# Patient Record
Sex: FEMALE | Race: ASIAN | NOT HISPANIC OR LATINO | Employment: FULL TIME | URBAN - METROPOLITAN AREA
[De-identification: names, ages, dates, MRNs, and addresses within clinical notes are randomized per-mention and may not be internally consistent; named-entity substitution may affect disease eponyms.]

---

## 2022-03-29 LAB — HCV AB SER-ACNC: NEGATIVE

## 2023-04-04 ENCOUNTER — OFFICE VISIT (OUTPATIENT)
Dept: DERMATOLOGY | Facility: CLINIC | Age: 48
End: 2023-04-04

## 2023-04-04 VITALS — WEIGHT: 147 LBS | TEMPERATURE: 97.3 F | HEIGHT: 64 IN | BODY MASS INDEX: 25.1 KG/M2

## 2023-04-04 DIAGNOSIS — L64.9 ANDROGENIC ALOPECIA: Primary | ICD-10-CM

## 2023-04-04 RX ORDER — METRONIDAZOLE 7.5 MG/G
GEL VAGINAL
COMMUNITY
Start: 2023-04-03

## 2023-04-04 RX ORDER — DIPHENOXYLATE HYDROCHLORIDE AND ATROPINE SULFATE 2.5; .025 MG/1; MG/1
1 TABLET ORAL DAILY
COMMUNITY

## 2023-04-04 RX ORDER — OMEGA-3S/DHA/EPA/FISH OIL/D3 300MG-1000
400 CAPSULE ORAL DAILY
COMMUNITY

## 2023-04-04 NOTE — PROGRESS NOTES
"[place] [address] [facility]     Patient Name: Long Brice  Encounter Date: 04/04/2023    Have you been cared for by a [place] [address] Dermatologist in the last 3 years and, if so, which description applies to you? NO  I am considered a \"new\" patient and must complete all patient intake questions  I am FEMALE/of child-bearing potential     REVIEW OF SYSTEMS:  Have you recently had or currently have any of the following? · Recent fever or chills? No  · Any non-healing wound? No  · Are you pregnant or planning to become pregnant? No  · Are you currently or planning to be nursing or breast feeding? No   PAST MEDICAL HISTORY:  Have you personally ever had or currently have any of the following? If \"YES,\" then please provide more detail  · Skin cancer (such as Melanoma, Basal Cell Carcinoma, Squamous Cell Carcinoma? No  · Tuberculosis, HIV/AIDS, Hepatitis B or C: No  · Systemic Immunosuppression such as Diabetes, Biologic or Immunotherapy, Chemotherapy, Organ Transplantation, Bone Marrow Transplantation No  · Radiation Treatment No   FAMILY HISTORY:  Any \"first degree relatives\" (parent, brother, sister, or child) with the following? • Skin Cancer, Pancreatic or Other Cancer? No   PATIENT EXPERIENCE:    • Do you want the Dermatologist to perform a COMPLETE skin exam today including a clinical examination under the \"bra and underwear\" areas? NO  • If necessary, do we have your permission to call and leave a detailed message on your Preferred Phone number that includes your specific medical information?   Yes      Allergies   Allergen Reactions   • Hydrocodone-Acetaminophen GI Intolerance     nausea      Current Outpatient Medications:   •  cholecalciferol (VITAMIN D3) 400 units tablet, Take 400 Units by mouth daily, Disp: , Rfl:   •  metroNIDAZOLE (METROGEL) 0 75 % vaginal gel, , Disp: , Rfl:   •  multivitamin (THERAGRAN) TABS, Take 1 tablet by mouth daily, Disp: , Rfl:           • Whom besides the patient " is providing clinical information about today's encounter?   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    Physical Exam:  • Anatomic Location Affected:  scalp  • Morphological Description:  thinning, increased variability  • Pertinent Positives:  • Pertinent Negatives: Additional History of Present Condition:  Patient here for hair loss has been happening for a couple of years, patient has been using minoxidil, vivascal, and darryl oil  Assessment and Plan:  Based on a thorough discussion of this condition and the management approach to it (including a comprehensive discussion of the known risks, side effects and potential benefits of treatment), the patient (family) agrees to implement the following specific plan:  • Discussed treatment options, such as:  o over the counter rogaine (minoxidil) 5% foam  Use one cap full a day on dry hair, part hair and apply directly to scalp  Do not do too close to bedtime  Need to do this daily  If you stop abruptly, you will lose hair  Can taper off if you don't like it    o Could do laser therapy like laser caps or thakur (examples: capillus, laser hair max)  Buy over the counter, online  o Cosmetic treatments like platelet rich plasma (PRP) do it monthly for 3 months   Dr Blanca Lee in our practice does this   o Hair transplant surgery   o Hair fibers, put on topically so it looks more full (example topix)  o Oral pills: spironolactone (hormonal blood pressure medication, risks include decreased blood pressure, lightheadedness, abnormal menses, breast tenderness, need to be careful of potassium intake), finasteride/dutasteride (hormonal medication, not recommended if personal or family history of breast/ovarian cancers) and minoxidil oral pills (risk of cardiac side effects such as swelling and fluid around heart)  • Plan for topical Minoxidil Scribe Attestation    I,:  Anabell Abebe MA am acting as a scribe while in the presence of the attending physician :       I,:  Kamala Sanabria MD personally performed the services described in this documentation    as scribed in my presence :

## 2023-04-04 NOTE — PATIENT INSTRUCTIONS
Assessment and Plan:  Based on a thorough discussion of this condition and the management approach to it (including a comprehensive discussion of the known risks, side effects and potential benefits of treatment), the patient (family) agrees to implement the following specific plan:  Discussed treatment options, such as:  over the counter rogaine (minoxidil) 5% foam  Use one cap full a day on dry hair, part hair and apply directly to scalp  Do not do too close to bedtime  Need to do this daily  If you stop abruptly, you will lose hair  Can taper off if you don't like it  Could do laser therapy like laser caps or thakur (examples: capillus, laser hair max)  Buy over the counter, online  Cosmetic treatments like platelet rich plasma (PRP) do it monthly for 3 months  Dr Venkata Barreto in our practice does this    Hair transplant surgery   Hair fibers, put on topically so it looks more full (example topix)  Oral pills: spironolactone (hormonal blood pressure medication, risks include decreased blood pressure, lightheadedness, abnormal menses, breast tenderness, need to be careful of potassium intake), finasteride/dutasteride (hormonal medication, not recommended if personal or family history of breast/ovarian cancers) and minoxidil oral pills (risk of cardiac side effects such as swelling and fluid around heart)  Plan for topical Minoxidil

## 2023-05-03 ENCOUNTER — TELEPHONE (OUTPATIENT)
Dept: ADMINISTRATIVE | Facility: OTHER | Age: 48
End: 2023-05-03

## 2023-05-03 PROBLEM — B27.00 GAMMAHERPESVIRAL MONONUCLEOSIS WITHOUT COMPLICATION: Status: ACTIVE | Noted: 2020-12-31

## 2023-05-03 PROBLEM — B27.00 GAMMAHERPESVIRAL MONONUCLEOSIS WITHOUT COMPLICATION: Status: RESOLVED | Noted: 2020-12-31 | Resolved: 2023-05-03

## 2023-05-03 PROBLEM — E55.9 VITAMIN D DEFICIENCY: Status: ACTIVE | Noted: 2020-12-31

## 2023-05-03 PROBLEM — E78.2 MIXED HYPERLIPIDEMIA: Status: ACTIVE | Noted: 2023-01-09

## 2023-05-03 NOTE — PROGRESS NOTES
Avis Allen 1975 female MRN: [id number]      ASSESSMENT/PLAN  Problem List Items Addressed This Visit    None  Visit Diagnoses     Healthcare maintenance    -  Primary        BP WNL   Labs UTD (had through work) -- is going to send records   Pap UTD   Mammogram UTD   CRC UTD -- will request records   Vaccinations: TDap UTD, COVID completed primary series  Encouraged regular physical activity, varied diet, and regular dental/eye exams     No future appointments  SUBJECTIVE  CC: Establish Care      HPI:  Avis Allen is a 49 y o  female who presents to establish care  History reviewed and updated as below  No acute concerns  Review of Systems   Constitutional: Negative for unexpected weight change  HENT: Negative for congestion, ear pain, rhinorrhea and sore throat  Eyes: Negative for visual disturbance  Respiratory: Negative for cough and shortness of breath  Cardiovascular: Negative for chest pain, palpitations and leg swelling  Gastrointestinal: Negative for abdominal pain, constipation and diarrhea  Endocrine: Negative for polyuria  Genitourinary: Negative for dysuria and menstrual problem  Musculoskeletal: Positive for back pain (seeing chiropractor/PT)  Neurological: Negative for dizziness and headaches  Psychiatric/Behavioral: Negative for sleep disturbance         Historical Information   The patient history was reviewed and updated as follows:    Past Medical History:   Diagnosis Date     delivery delivered     Gammaherpesviral mononucleosis without complication      Past Surgical History:   Procedure Laterality Date    BUNIONECTOMY Bilateral      SECTION      x2    TUBAL LIGATION Bilateral     removal     Family History   Problem Relation Age of Onset    Breast cancer Mother         46s    No Known Problems Father     No Known Problems Brother     Osteoporosis Maternal Grandmother     Dementia Paternal Grandmother       Social "History   Social History     Substance and Sexual Activity   Alcohol Use Yes    Comment: Rare     Social History     Substance and Sexual Activity   Drug Use Not Currently     Social History     Tobacco Use   Smoking Status Never   Smokeless Tobacco Never       Medications:     Current Outpatient Medications:     minoxidil (ROGAINE) 2 % external solution, Apply topically 2 (two) times a day, Disp: , Rfl:     cholecalciferol (VITAMIN D3) 400 units tablet, Take 400 Units by mouth daily, Disp: , Rfl:     multivitamin (THERAGRAN) TABS, Take 1 tablet by mouth daily, Disp: , Rfl:   Allergies   Allergen Reactions    Hydrocodone-Acetaminophen GI Intolerance     nausea       OBJECTIVE    Vitals:   Vitals:    05/04/23 0802   BP: 112/76   BP Location: Left arm   Patient Position: Sitting   Cuff Size: Adult   Pulse: 74   Temp: 97 5 °F (36 4 °C)   SpO2: 96%   Weight: 63 5 kg (140 lb)   Height: 5' 4\" (1 626 m)           Physical Exam  Vitals and nursing note reviewed  Constitutional:       General: She is not in acute distress  Appearance: Normal appearance  HENT:      Head: Normocephalic and atraumatic  Right Ear: Tympanic membrane, ear canal and external ear normal       Left Ear: Tympanic membrane, ear canal and external ear normal       Nose: Nose normal       Mouth/Throat:      Mouth: Mucous membranes are moist       Pharynx: No oropharyngeal exudate or posterior oropharyngeal erythema  Eyes:      Conjunctiva/sclera: Conjunctivae normal    Cardiovascular:      Rate and Rhythm: Normal rate and regular rhythm  Pulmonary:      Effort: Pulmonary effort is normal  No respiratory distress  Breath sounds: Normal breath sounds  Abdominal:      General: Bowel sounds are normal  There is no distension  Palpations: Abdomen is soft  Tenderness: There is no abdominal tenderness  Musculoskeletal:      Right lower leg: No edema  Left lower leg: No edema     Lymphadenopathy:      Cervical: No " cervical adenopathy  Skin:     General: Skin is warm and dry  Neurological:      General: No focal deficit present  Mental Status: She is alert     Psychiatric:         Mood and Affect: Mood normal                     DO Patti [address]an [address] [facility]   5/4/2023  8:21 AM

## 2023-05-03 NOTE — TELEPHONE ENCOUNTER
----- Message from Amanda Whitley DO sent at 5/3/2023  9:48 AM EDT -----  05/03/23 9:49 AM    Hello, our patient Teto Artist has had Hepatitis C completed/performed  Please assist in updating the patient chart by pulling the Care Everywhere (CE) document  The date of service is 03/29/2022       Thank you,  Amanda Whitley DO  [facility]

## 2023-05-03 NOTE — TELEPHONE ENCOUNTER
Upon review of the In Basket request we were able to locate, review, and update the patient chart as requested for Hepatitis C  and Pap Smear (HPV) aka Cervical Cancer Screening  and Mammogram    Any additional questions or concerns should be emailed to the Practice Liaisons via the appropriate education email address, please do not reply via In Basket      Thank you  Tati Card Continue Regimen: Plaquenil and olux Detail Level: Zone Otc Regimen: Rogaine for men once to twice daily Plan: Hormonal alopecia discussed with patient

## 2023-05-03 NOTE — TELEPHONE ENCOUNTER
----- Message from [facility],  sent at 5/3/2023  9:47 AM EDT -----  05/03/23 9:47 AM    Hello, our patient Frances Mckeon has had Mammogram and Pap Smear (HPV) aka Cervical Cancer Screening completed/performed  Please assist in updating the patient chart by pulling the Care Everywhere (CE) document  The date of service is 03/29/2023 (pap), 10/21/2022 (mammo)       Thank you,  [facility], DO GALI NAJERA

## 2023-05-04 ENCOUNTER — TELEPHONE (OUTPATIENT)
Dept: ADMINISTRATIVE | Facility: OTHER | Age: 48
End: 2023-05-04

## 2023-05-04 ENCOUNTER — OFFICE VISIT (OUTPATIENT)
Dept: FAMILY MEDICINE CLINIC | Facility: CLINIC | Age: 48
End: 2023-05-04

## 2023-05-04 VITALS
OXYGEN SATURATION: 96 % | DIASTOLIC BLOOD PRESSURE: 76 MMHG | WEIGHT: 140 LBS | BODY MASS INDEX: 23.9 KG/M2 | HEART RATE: 74 BPM | TEMPERATURE: 97.5 F | SYSTOLIC BLOOD PRESSURE: 112 MMHG | HEIGHT: 64 IN

## 2023-05-04 DIAGNOSIS — Z00.00 HEALTHCARE MAINTENANCE: Primary | ICD-10-CM

## 2023-05-04 NOTE — LETTER
Procedure Request Form: Colonoscopy       Date Requested: 23  Patient: Jerome Nevareze  Patient : 1975   Referring Provider: Izella Client, DO        Date of Procedure _____4-4-21_____________________       The above patient has informed us that they have completed their   most recent Colonoscopy at your facility  Please complete   this form and attach all corresponding procedure reports/results  Comments __________________________________________________________  ____________________________________________________________________  ____________________________________________________________________  ____________________________________________________________________    Facility Completing Procedure _________________________________________    Form Completed By (print name) _______________________________________      Signature __________________________________________________________      These reports are needed for  compliance  Please fax this completed form and a copy of the procedure report to our office located at [place] [address] as soon as possible to Fax [phone] attention Sandie Mcfarlane: Phone [phone]    We thank you for your assistance in treating our mutual patient

## 2023-05-04 NOTE — TELEPHONE ENCOUNTER
----- Message from [address] sent at 5/4/2023  9:54 AM EDT -----  Regarding: Colonoscopy  05/04/23 9:55 AM    Hello, our patient aKte De La Cruz has had CRC: Colonoscopy completed/performed  Please assist in updating the patient chart by making an External outreach to Dr Guilherme Collins facility located in [facility]  The date of service is 07/06/2022      Thank you,  Ana NAJERA

## 2023-05-04 NOTE — TELEPHONE ENCOUNTER
----- Message from Juanjose Porras sent at 5/4/2023  9:53 AM EDT -----  Regarding: Deni Woodward Request  05/04/23 9:53 AM    Hello, our patient Piyush Choi has had CRC: Colonoscopy completed/performed  Please assist in updating the patient chart by making an External outreach to [address] [place] [address] located in [facility]  The date of service is 07/06/2022      Thank you,  Juanjose NAJERA

## 2023-05-08 NOTE — TELEPHONE ENCOUNTER
Upon review of the In Basket request and the patient's chart, initial outreach has been made via fax to facility  Please see Contacts section for details       Thank you  Namita Cooper

## 2023-05-09 NOTE — TELEPHONE ENCOUNTER
Upon review of the In Basket request we were able to locate, review, and update the patient chart as requested for CRC: Colonoscopy  Any additional questions or concerns should be emailed to the Practice Liaisons via the appropriate education email address, please do not reply via In Basket      Thank you  Sofi Thomas

## 2024-04-11 ENCOUNTER — OFFICE VISIT (OUTPATIENT)
Dept: OBGYN CLINIC | Facility: CLINIC | Age: 49
End: 2024-04-11
Payer: COMMERCIAL

## 2024-04-11 VITALS
WEIGHT: 129.4 LBS | DIASTOLIC BLOOD PRESSURE: 74 MMHG | BODY MASS INDEX: 22.09 KG/M2 | SYSTOLIC BLOOD PRESSURE: 102 MMHG | HEIGHT: 64 IN

## 2024-04-11 DIAGNOSIS — Z12.4 SCREENING FOR MALIGNANT NEOPLASM OF THE CERVIX: ICD-10-CM

## 2024-04-11 DIAGNOSIS — Z12.31 SCREENING MAMMOGRAM, ENCOUNTER FOR: ICD-10-CM

## 2024-04-11 DIAGNOSIS — Z01.419 WOMEN'S ANNUAL ROUTINE GYNECOLOGICAL EXAMINATION: Primary | ICD-10-CM

## 2024-04-11 DIAGNOSIS — R92.30 DENSE BREAST TISSUE ON MAMMOGRAM, UNSPECIFIED TYPE: ICD-10-CM

## 2024-04-11 PROCEDURE — S0612 ANNUAL GYNECOLOGICAL EXAMINA: HCPCS | Performed by: OBSTETRICS & GYNECOLOGY

## 2024-04-11 PROCEDURE — G0476 HPV COMBO ASSAY CA SCREEN: HCPCS | Performed by: OBSTETRICS & GYNECOLOGY

## 2024-04-11 PROCEDURE — G0145 SCR C/V CYTO,THINLAYER,RESCR: HCPCS | Performed by: OBSTETRICS & GYNECOLOGY

## 2024-04-11 NOTE — PROGRESS NOTES
Subjective      Titi Zuluaga is a 48 y.o. female who presents for annual well woman exam. Periods are regular every 28-30 days, lasting 6 days. No intermenstrual bleeding, spotting, or discharge.  Last month period lasted fro 13 days was spotting then normal flow for 7 days then pass some clots     Current contraception: tubal ligation  History of abnormal Pap smear: no  Family history of uterine or ovarian cancer: no  Family history of breast cancer: yes - mother < 50   Saw genetic  counselor and told she dosnt need BRCA   Menstrual History:  OB History          3    Para   2    Term   2            AB   1    Living   2         SAB   1    IAB        Ectopic        Multiple        Live Births   2           Obstetric Comments   14, 6 girls                 Patient's last menstrual period was 2024.  Period Duration (Days): 5-7days  Period Pattern: (!) Irregular (period lasting for 2 weeks for the last 1.5-2 yrs)  Menstrual Flow: Moderate  Dysmenorrhea: (!) Mild  Dysmenorrhea Symptoms: Cramping    The following portions of the patient's history were reviewed and updated as appropriate: allergies, current medications, past family history, past medical history, past social history, past surgical history, and problem list.    Review of Systems  Review of Systems   Constitutional:  Negative for activity change, appetite change, chills, fatigue and fever.   Respiratory:  Negative for apnea, cough, chest tightness and shortness of breath.    Cardiovascular:  Negative for chest pain, palpitations and leg swelling.   Gastrointestinal:  Negative for abdominal pain, constipation, diarrhea, nausea and vomiting.   Genitourinary:  Negative for difficulty urinating, dysuria, flank pain, frequency, hematuria and urgency.   Neurological:  Negative for dizziness, seizures, syncope, light-headedness, numbness and headaches.   Psychiatric/Behavioral:  Negative for agitation and confusion.           Objective      BP  "102/74 (BP Location: Left arm, Patient Position: Sitting, Cuff Size: Adult)   Ht 5' 4\" (1.626 m)   Wt 58.7 kg (129 lb 6.4 oz)   LMP 2024   BMI 22.21 kg/m²     Physical Exam  OBGyn Exam     General:   alert and oriented, in no acute distress, alert, appears stated age, and cooperative   Heart: regular rate and rhythm, S1, S2 normal, no murmur, click, rub or gallop   Lungs: clear to auscultation bilaterally   Abdomen: soft, non-tender, without masses or organomegaly   Vulva: normal   Vagina: normal mucosa, normal discharge   Cervix: no cervical motion tenderness and no lesions   Uterus: normal size   Adnexa:  Breast Exam:  normal adnexa  breasts appear normal, no suspicious masses, no skin or nipple changes or axillary nodes.                Assessment      @well woman@ .  48-year-old female  Annual exam  Prior 2  section  History of tubal ligation  Family history of breast cancer/dense breast  Patient had genetic consult done candidate for genetic testing  Mild MEGHANA  Plan   Pap/HPV  Diet/exercise  Calcium/vitamin D  Mammogram/ABUS ultrasound this year  Mammogram/MRI next year  Lifestyle medication reviewed and discussed with patient  Perimenopausal changes reviewed and discussed with patient  Magnesium at bedtime and decrease caffeine recommended to help sleep  Kegel exercise and bladder diet  Return to office for annual   All questions answered.     There are no Patient Instructions on file for this visit.     "

## 2024-04-12 LAB
HPV HR 12 DNA CVX QL NAA+PROBE: NEGATIVE
HPV16 DNA CVX QL NAA+PROBE: NEGATIVE
HPV18 DNA CVX QL NAA+PROBE: NEGATIVE

## 2024-04-18 LAB
LAB AP GYN PRIMARY INTERPRETATION: NORMAL
Lab: NORMAL

## 2024-05-29 PROBLEM — Z91.89 AT INCREASED RISK OF BREAST CANCER: Status: RESOLVED | Noted: 2024-01-23 | Resolved: 2024-05-29

## 2024-05-29 PROBLEM — R92.333 HETEROGENEOUSLY DENSE TISSUE OF BOTH BREASTS ON MAMMOGRAPHY: Status: ACTIVE | Noted: 2024-01-23

## 2024-05-29 NOTE — PROGRESS NOTES
Titi Zuluaga 1975 female MRN: [id number]      ASSESSMENT/PLAN  Problem List Items Addressed This Visit    None  Visit Diagnoses     Healthcare maintenance    -  Primary        BP WNL   Pt has labs through work -- will send copy   Pap UTD  Mammogram UTD  CRC UTD   Vaccinations: TDap UTD, Shingles reviewed, COVID completed primary   Encouraged regular physical activity, varied diet, and regular dental/eye exams         Future Appointments   Date Time Provider Department Center   2025  8:15 AM Leslie Corrales MD OBGYN [facility]          SUBJECTIVE  CC: Physical Exam      HPI:  Titi Zuluaga is a 49 y.o. female who presents for annual physical. History reviewed and updated as below.     Review of Systems   Constitutional:  Negative for unexpected weight change.   HENT:  Negative for congestion, ear pain, rhinorrhea and sore throat.    Eyes:  Negative for visual disturbance.   Respiratory:  Negative for cough and shortness of breath.    Cardiovascular:  Negative for chest pain, palpitations and leg swelling.   Gastrointestinal:  Negative for abdominal pain, blood in stool, constipation and diarrhea.   Endocrine: Negative for polyuria.   Genitourinary:  Negative for dysuria, hematuria and menstrual problem (had one episode of 2 weeks of bleeding, otherwise still normal).   Neurological:  Negative for dizziness and headaches.   Psychiatric/Behavioral:  Negative for sleep disturbance.        Historical Information   The patient history was reviewed and updated as follows:    Past Medical History:   Diagnosis Date   •  delivery delivered    • Gammaherpesviral mononucleosis without complication 2020   • In vitro fertilization      Past Surgical History:   Procedure Laterality Date   • BUNIONECTOMY Bilateral    •  SECTION      x2   • TUBAL LIGATION Bilateral     removal     Family History   Problem Relation Age of Onset   • Breast cancer Mother         50s   • No Known Problems  "Father    • No Known Problems Brother    • Osteoporosis Maternal Grandmother    • Dementia Paternal Grandmother       Social History   Social History     Substance and Sexual Activity   Alcohol Use Yes    Comment: Rare     Social History     Substance and Sexual Activity   Drug Use Never     Social History     Tobacco Use   Smoking Status Never   Smokeless Tobacco Never       Medications:     Current Outpatient Medications:   •  cholecalciferol (VITAMIN D3) 400 units tablet, Take 400 Units by mouth daily, Disp: , Rfl:   •  multivitamin (THERAGRAN) TABS, Take 1 tablet by mouth daily, Disp: , Rfl:   •  minoxidil (ROGAINE) 2 % external solution, Apply topically 2 (two) times a day, Disp: , Rfl:   Allergies   Allergen Reactions   • Hydrocodone-Acetaminophen GI Intolerance     nausea       OBJECTIVE    Vitals:   Vitals:    05/30/24 0810   BP: 100/74   BP Location: Left arm   Patient Position: Sitting   Cuff Size: Standard   Pulse: 64   Temp: 97.8 °F (36.6 °C)   SpO2: 98%   Weight: 58.5 kg (129 lb)   Height: 5' 4\" (1.626 m)           Physical Exam  Vitals and nursing note reviewed.   Constitutional:       General: She is not in acute distress.     Appearance: Normal appearance.   HENT:      Head: Normocephalic and atraumatic.      Right Ear: Tympanic membrane, ear canal and external ear normal.      Left Ear: Tympanic membrane, ear canal and external ear normal.      Nose: Nose normal.      Mouth/Throat:      Mouth: Mucous membranes are moist.      Pharynx: No oropharyngeal exudate or posterior oropharyngeal erythema.   Eyes:      Conjunctiva/sclera: Conjunctivae normal.   Cardiovascular:      Rate and Rhythm: Normal rate and regular rhythm.   Pulmonary:      Effort: Pulmonary effort is normal. No respiratory distress.      Breath sounds: Normal breath sounds.   Abdominal:      General: Bowel sounds are normal. There is no distension.      Palpations: Abdomen is soft.      Tenderness: There is no abdominal tenderness. "   Musculoskeletal:      Right lower leg: No edema.      Left lower leg: No edema.   Lymphadenopathy:      Cervical: No cervical adenopathy.   Skin:     General: Skin is warm and dry.   Neurological:      Mental Status: She is alert.      Comments: Grossly intact   Psychiatric:         Mood and Affect: Mood normal.                    Analy Bhat DO  [facility]   5/30/2024  8:25 AM

## 2024-05-30 ENCOUNTER — OFFICE VISIT (OUTPATIENT)
Dept: FAMILY MEDICINE CLINIC | Facility: CLINIC | Age: 49
End: 2024-05-30
Payer: COMMERCIAL

## 2024-05-30 VITALS
TEMPERATURE: 97.8 F | SYSTOLIC BLOOD PRESSURE: 100 MMHG | BODY MASS INDEX: 22.02 KG/M2 | WEIGHT: 129 LBS | HEIGHT: 64 IN | OXYGEN SATURATION: 98 % | HEART RATE: 64 BPM | DIASTOLIC BLOOD PRESSURE: 74 MMHG

## 2024-05-30 DIAGNOSIS — Z00.00 HEALTHCARE MAINTENANCE: Primary | ICD-10-CM

## 2024-05-30 PROCEDURE — 99396 PREV VISIT EST AGE 40-64: CPT | Performed by: FAMILY MEDICINE

## 2024-06-28 ENCOUNTER — TELEPHONE (OUTPATIENT)
Dept: OBGYN CLINIC | Facility: CLINIC | Age: 49
End: 2024-06-28

## 2024-06-28 DIAGNOSIS — N63.10 MASS OF RIGHT BREAST, UNSPECIFIED QUADRANT: Primary | ICD-10-CM

## 2024-06-28 NOTE — TELEPHONE ENCOUNTER
Patient notiifed of results, orders placed, advised to call central scheduling.  Advised patient to get previous imaging from [facility].

## 2025-04-08 ENCOUNTER — HOSPITAL ENCOUNTER (OUTPATIENT)
Dept: MAMMOGRAPHY | Facility: CLINIC | Age: 50
Discharge: HOME | End: 2025-04-08
Payer: COMMERCIAL

## 2025-04-08 DIAGNOSIS — Z12.31 SCREENING MAMMOGRAM, ENCOUNTER FOR: ICD-10-CM

## 2025-04-08 PROCEDURE — 77067 SCR MAMMO BI INCL CAD: CPT

## 2025-04-08 PROCEDURE — 77063 BREAST TOMOSYNTHESIS BI: CPT

## 2025-04-11 ENCOUNTER — RESULTS FOLLOW-UP (OUTPATIENT)
Dept: OBGYN CLINIC | Facility: CLINIC | Age: 50
End: 2025-04-11

## 2025-04-11 DIAGNOSIS — R92.30 DENSE BREAST: Primary | ICD-10-CM

## 2025-06-20 NOTE — PROGRESS NOTES
Annual Exam Pre-charting    Last Annual Exam: 2024    Last PAP/HPV Test and Result: 2024, PAP/HPV neg    Last Mammo Screenin2025    Last STD Culture Screening: unknown    Current BC Method: tubal ligation

## 2025-06-23 ENCOUNTER — ANNUAL EXAM (OUTPATIENT)
Dept: OBGYN CLINIC | Facility: CLINIC | Age: 50
End: 2025-06-23
Payer: COMMERCIAL

## 2025-06-23 VITALS
BODY MASS INDEX: 20.32 KG/M2 | HEIGHT: 64 IN | DIASTOLIC BLOOD PRESSURE: 70 MMHG | WEIGHT: 119 LBS | SYSTOLIC BLOOD PRESSURE: 110 MMHG

## 2025-06-23 DIAGNOSIS — Z12.31 SCREENING MAMMOGRAM, ENCOUNTER FOR: ICD-10-CM

## 2025-06-23 DIAGNOSIS — N89.8 VAGINAL DISCHARGE: ICD-10-CM

## 2025-06-23 DIAGNOSIS — Z01.419 WOMEN'S ANNUAL ROUTINE GYNECOLOGICAL EXAMINATION: Primary | ICD-10-CM

## 2025-06-23 DIAGNOSIS — R92.30 DENSE BREAST: ICD-10-CM

## 2025-06-23 PROCEDURE — S0612 ANNUAL GYNECOLOGICAL EXAMINA: HCPCS | Performed by: OBSTETRICS & GYNECOLOGY

## 2025-06-23 PROCEDURE — 87510 GARDNER VAG DNA DIR PROBE: CPT | Performed by: OBSTETRICS & GYNECOLOGY

## 2025-06-23 PROCEDURE — 87660 TRICHOMONAS VAGIN DIR PROBE: CPT | Performed by: OBSTETRICS & GYNECOLOGY

## 2025-06-23 PROCEDURE — 87480 CANDIDA DNA DIR PROBE: CPT | Performed by: OBSTETRICS & GYNECOLOGY

## 2025-06-23 NOTE — PROGRESS NOTES
"Eliza Zuluaga is a 50 y.o. female who presents for annual well woman exam. Periods are start skipping, lasting 5 days. No intermenstrual bleeding, spotting, or discharge.    Current contraception: tubal ligation  History of abnormal Pap smear: no    Family history of uterine or ovarian cancer: no  Family history of breast cancer: yes - mother < 50   Saw genetic  counselor and told she dosnt need BRCA       Menstrual History:  OB History          3    Para   2    Term   2            AB   1    Living   2         SAB   1    IAB        Ectopic        Multiple        Live Births   2           Obstetric Comments   14, 6 girls                 Patient's last menstrual period was 2025 (approximate).  Period Duration (Days): 4-5  Period Pattern: (!) Irregular  Menstrual Flow: Moderate, Light  Dysmenorrhea: None    The following portions of the patient's history were reviewed and updated as appropriate: allergies, current medications, past family history, past medical history, past social history, past surgical history, and problem list.    Review of Systems  Review of Systems   Constitutional:  Negative for activity change, appetite change, chills, fatigue and fever.   Respiratory:  Negative for apnea, cough, chest tightness and shortness of breath.    Cardiovascular:  Negative for chest pain, palpitations and leg swelling.   Gastrointestinal:  Negative for abdominal pain, constipation, diarrhea, nausea and vomiting.   Genitourinary:  Negative for difficulty urinating, dysuria, flank pain, frequency, hematuria and urgency.   Neurological:  Negative for dizziness, seizures, syncope, light-headedness, numbness and headaches.   Psychiatric/Behavioral:  Negative for agitation and confusion.           Objective      /70 (BP Location: Left arm, Patient Position: Sitting, Cuff Size: Adult)   Ht 5' 4\" (1.626 m)   Wt 54 kg (119 lb)   LMP 2025 (Approximate)   BMI 20.43 kg/m² "     Physical Exam  OBGyn Exam     General:   alert and oriented, in no acute distress, alert, appears stated age, and cooperative   Heart: regular rate and rhythm, S1, S2 normal, no murmur, click, rub or gallop   Lungs: clear to auscultation bilaterally   Abdomen: soft, non-tender, without masses or organomegaly   Vulva: normal, Bartholin's, Urethra, [place]'s normal   Vagina: normal mucosa, normal discharge   Cervix: no cervical motion tenderness and no lesions   Uterus: normal size   Adnexa:  Breast Exam:  normal adnexa  breasts appear normal, no suspicious masses, no skin or nipple changes or axillary nodes.                Assessment      @well woman@ .     50-year-old female  Annual exam  Prior 2  section  History of tubal ligation  Family history of breast cancer/dense breast  Patient had genetic consult done candidate for genetic testing  Mild MEGHANA  Plan   Pap/HPV neg   Diet/exercise  Calcium/vitamin D  Mammogram/ABUS ultrasound   Lifestyle medication reviewed and discussed with patient  Perimenopausal changes reviewed and discussed with patient  Magnesium at bedtime and decrease caffeine recommended to help sleep  Kegel exercise and bladder diet  Return to office for annual       All questions answered.     There are no Patient Instructions on file for this visit.

## 2025-06-24 LAB
CANDIDA RRNA VAG QL PROBE: NOT DETECTED
G VAGINALIS RRNA GENITAL QL PROBE: DETECTED
T VAGINALIS RRNA GENITAL QL PROBE: NOT DETECTED

## 2025-07-02 NOTE — PROGRESS NOTES
"Name: Titi Zuluaga      : 1975      MRN: [id number]  Encounter Provider: Analy Bhat DO  Encounter Date: 7/3/2025   Encounter department: [facility]  :  Assessment & Plan  Healthcare maintenance  BP WNL   Labs UTD -- pt is going to provide results   Pap UTD   Mammogram UTD   CRC UTD   Vaccinations: TDap UTD, Shingles reviewed   Encouraged regular physical activity, varied diet, and regular dental/eye exams                 History of Present Illness   HPI    Pt presents for annual physical     Review of Systems   Constitutional:  Negative for unexpected weight change.   HENT:  Negative for congestion, ear pain, rhinorrhea and sore throat.    Eyes:  Negative for visual disturbance.   Respiratory:  Negative for cough and shortness of breath.    Cardiovascular:  Negative for chest pain, palpitations and leg swelling.   Gastrointestinal:  Negative for abdominal pain, blood in stool, constipation and diarrhea.   Endocrine: Negative for polyuria.   Genitourinary:  Negative for dysuria and hematuria. Menstrual problem: irregular -- last in 2025; less heavy/crampy.  Neurological:  Negative for dizziness and headaches.   Psychiatric/Behavioral:  Negative for sleep disturbance.        Objective   /78   Pulse 72   Temp (!) 96.3 °F (35.7 °C)   Ht 5' 4\" (1.626 m)   Wt 54.4 kg (120 lb)   LMP 2025 (Approximate)   SpO2 98%   BMI 20.60 kg/m²      Physical Exam  Vitals and nursing note reviewed.   Constitutional:       General: She is not in acute distress.     Appearance: She is well-developed.   HENT:      Head: Normocephalic and atraumatic.      Right Ear: Tympanic membrane, ear canal and external ear normal.      Left Ear: Tympanic membrane, ear canal and external ear normal.      Nose: Nose normal. No rhinorrhea.      Mouth/Throat:      Mouth: Mucous membranes are moist.      Pharynx: No oropharyngeal exudate or posterior oropharyngeal erythema.     Eyes:      " Conjunctiva/sclera: Conjunctivae normal.       Cardiovascular:      Rate and Rhythm: Normal rate and regular rhythm.   Pulmonary:      Effort: Pulmonary effort is normal. No respiratory distress.      Breath sounds: Normal breath sounds.   Abdominal:      General: Bowel sounds are normal. There is no distension.      Palpations: Abdomen is soft.      Tenderness: There is no abdominal tenderness.     Musculoskeletal:      Right lower leg: No edema.      Left lower leg: No edema.   Lymphadenopathy:      Cervical: No cervical adenopathy.     Skin:     General: Skin is warm and dry.     Neurological:      Mental Status: She is alert.      Comments: Grossly intact   Psychiatric:         Mood and Affect: Mood normal.

## 2025-07-03 ENCOUNTER — OFFICE VISIT (OUTPATIENT)
Dept: FAMILY MEDICINE CLINIC | Facility: CLINIC | Age: 50
End: 2025-07-03
Payer: COMMERCIAL

## 2025-07-03 VITALS
OXYGEN SATURATION: 98 % | BODY MASS INDEX: 20.49 KG/M2 | SYSTOLIC BLOOD PRESSURE: 106 MMHG | DIASTOLIC BLOOD PRESSURE: 78 MMHG | WEIGHT: 120 LBS | HEIGHT: 64 IN | TEMPERATURE: 96.3 F | HEART RATE: 72 BPM

## 2025-07-03 DIAGNOSIS — Z00.00 HEALTHCARE MAINTENANCE: Primary | ICD-10-CM

## 2025-07-03 PROCEDURE — 99396 PREV VISIT EST AGE 40-64: CPT | Performed by: FAMILY MEDICINE
